# Patient Record
Sex: MALE | Race: WHITE | NOT HISPANIC OR LATINO | Employment: OTHER | URBAN - METROPOLITAN AREA
[De-identification: names, ages, dates, MRNs, and addresses within clinical notes are randomized per-mention and may not be internally consistent; named-entity substitution may affect disease eponyms.]

---

## 2023-03-09 ENCOUNTER — PATIENT OUTREACH (OUTPATIENT)
Dept: PRIMARY CARE | Facility: CLINIC | Age: 78
End: 2023-03-09

## 2023-03-09 ENCOUNTER — TELEPHONE (OUTPATIENT)
Dept: PRIMARY CARE | Facility: CLINIC | Age: 78
End: 2023-03-09

## 2023-03-09 NOTE — PROGRESS NOTES
Discharge facility: [facility]  Discharge diagnosis: UTI, Acute respiratory failure, CAP,Hypoxia, Metabolic encephalopathy  Admission date: 3-2-23  Discharge date: 3-7-23  TCM call to patient. Spoke with patient's wife. She reports patient continues to be tired and weak. She reports home care was there today and plan to get patient physical therapy. She reports patient is eating and she is encouraging fluids. She reports he is taking al medications as ordered. Denies further concerns Patient has appointment with oncologist next week. Patient is planning on following up with palliative care dr that can come to his home.    Engagement  Call Start Time: 1500 (3/9/2023  3:09 PM)    Medications  Medications reviewed with patient/caregiver?: Yes (3/9/2023  3:09 PM)  Is the patient having any side effects they believe may be caused by any medication additions or changes?: (!) Yes (Wife thought pateint may have thrown up his antibiotic this morning. Reports eh did call in and speak with LPN for PCP. and they are going to try to take meidcations again this evening.) (3/9/2023  3:09 PM)  Does the patient have all medications ordered at discharge?: Yes (3/9/2023  3:09 PM)  Is the patient taking all medications as directed (includes completed medication regime)?: Yes (3/9/2023  3:09 PM)    Appointments  Does the patient have a primary care provider?: -- (Wife reported at the end of the call Palliative Care Dr is planning to take over pateint's care so he can come to the home as she has difficulty getting pateint out of the home and in to the car.) (3/9/2023  3:09 PM)    Self Management  What is the home health agency?:  [facility] (3/9/2023  3:09 PM)  Has home health visited the patient within 72 hours of discharge?: Yes (3/9/2023  3:09 PM)    Patient Teaching  Does the patient have access to their discharge instructions?: Yes (3/9/2023  3:09 PM)  What is the patient's perception of their health status since  discharge?: Same (3/9/2023  3:09 PM)  Is the patient/caregiver able to teach back the hierarchy of who to call/visit for symptoms/problems? PCP, Specialist, Home Health nurse, Urgent Care, ED, 911: Yes (3/9/2023  3:09 PM)

## 2023-03-30 ENCOUNTER — TELEPHONE (OUTPATIENT)
Dept: PRIMARY CARE | Facility: CLINIC | Age: 78
End: 2023-03-30

## 2023-03-30 NOTE — TELEPHONE ENCOUNTER
PC from Massiel at [facility] received a referral from [facility] to follow pt. after discharge, she was wondering if you would sign orders. Advised I thought they were with a different agency and would check with his wife and call her back.  [phone].   PC with wife Sonya, states she wanted them to cont with [facility] since the nurses and therapists know him, but he is at [facility] currently and had asked them to refer back to . She will try again, is not happy with what is happening there she told them last night he wasn't doing well and they found him unresponsive this am because his 02 level was so low, she says they weren't monitoring him and then was not able to have the procedure schedule today completed, may be done tomorrow if improved. Went to [facility] because the specialist wanted him at [facility].  PC back to Hay[facility]/ [facility] and notified they would prefer to cont. with , she will notify the  at [facility].

## 2023-04-03 ENCOUNTER — TELEPHONE (OUTPATIENT)
Dept: PRIMARY CARE | Facility: CLINIC | Age: 78
End: 2023-04-03

## 2023-04-03 ENCOUNTER — PATIENT OUTREACH (OUTPATIENT)
Dept: PRIMARY CARE | Facility: CLINIC | Age: 78
End: 2023-04-03

## 2023-04-03 NOTE — TELEPHONE ENCOUNTER
Spoke with Sonya, states he was discharged home on new meds for a-fib (Digoxin 125 mcg every day) and low B/P ( Midodrine 5 mg to be given tid if systolic is <115) and they stopped the Lisinopril. Before they discharged him the pharmacist said to stop Glipizide and Metformin, but they were giving him injections in the hospital and she thinks they thought he was getting insulin at home which he isn't his BS was 207 so she can't imagine she should stop these unless they interact with the dig and or Midodrine, Do they?   Also REAL, was sent home on a Pleurex that she needs to drain every other day and his PSA is 212 up 100 pts. in a week and there are cancer cells in the fluid. The new Palliative care PCP comes on Wed., but she really wanted your opinion on the glucose meds because you have known him for so long. She really hates to lose you, but just can't get him out of the house anymore it is such a challenge when he has to.

## 2023-04-03 NOTE — TELEPHONE ENCOUNTER
Sonya called regarding Pts recent hospital stay (this weekend) for shortness of breath, resulting in a permanent drain for fluid on his lungs. She is requesting a call about the hospitals request to stop certain medications and to start others proscribed at the time of the hospital stay. She is concerned about interactions.

## 2023-04-04 NOTE — TELEPHONE ENCOUNTER
Left detailed message notifying wife Sonya ([facility] left out the offer to do home visits since they are arranging Palliative care through [facility].)

## 2023-04-17 ENCOUNTER — TELEPHONE (OUTPATIENT)
Dept: PRIMARY CARE | Facility: CLINIC | Age: 78
End: 2023-04-17

## 2023-04-17 NOTE — TELEPHONE ENCOUNTER
Sonya, Pt's wife, called in to speak with ERICA or Dr HARVEY in regards to the Pt having started on Hospice care with [facility].

## 2023-04-17 NOTE — TELEPHONE ENCOUNTER
Spoke with Sonya She did not think things were going to move so quickly, but they have transitioned form palliative care to Hospice, today Sanju is in his hospital bed looking out the window, only taking some small amts of fluids hospice is going to bring him the swabs to keep his mouth moist tomorrow, he announced yesterday he wasn't going to eat anymore, but today said he might eat tomorrow. She just wanted you to be aware. She said it was neat the Hospice nurse who came took care of her father in 2015 and genuinely remembered him and some of his stories. She would probably like a call from you, but she didn't ask.